# Patient Record
Sex: FEMALE | URBAN - METROPOLITAN AREA
[De-identification: names, ages, dates, MRNs, and addresses within clinical notes are randomized per-mention and may not be internally consistent; named-entity substitution may affect disease eponyms.]

---

## 2019-05-08 ENCOUNTER — OFFICE VISIT (OUTPATIENT)
Dept: URBAN - METROPOLITAN AREA CLINIC 22 | Facility: CLINIC | Age: 19
End: 2019-05-08
Payer: COMMERCIAL

## 2019-05-08 DIAGNOSIS — H52.223 REGULAR ASTIGMATISM, BILATERAL: Primary | ICD-10-CM

## 2019-05-08 DIAGNOSIS — Q12.0 CONGENITAL CATARACT: ICD-10-CM

## 2019-05-08 PROCEDURE — 92015 DETERMINE REFRACTIVE STATE: CPT | Performed by: OPTOMETRIST

## 2019-05-08 PROCEDURE — 92004 COMPRE OPH EXAM NEW PT 1/>: CPT | Performed by: OPTOMETRIST

## 2019-05-08 ASSESSMENT — INTRAOCULAR PRESSURE
OD: 13
OS: 13

## 2019-05-08 ASSESSMENT — VISUAL ACUITY
OS: 20/30
OD: 20/25

## 2019-05-08 NOTE — IMPRESSION/PLAN
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 Plan:  No treatment currently recommended. The patient will monitor vision changes and contact us with any decrease in vision.